# Patient Record
Sex: FEMALE | Race: WHITE | Employment: FULL TIME | URBAN - METROPOLITAN AREA
[De-identification: names, ages, dates, MRNs, and addresses within clinical notes are randomized per-mention and may not be internally consistent; named-entity substitution may affect disease eponyms.]

---

## 2017-08-08 ENCOUNTER — OFFICE VISIT (OUTPATIENT)
Dept: FAMILY MEDICINE CLINIC | Facility: CLINIC | Age: 27
End: 2017-08-08

## 2017-08-08 VITALS
WEIGHT: 114 LBS | HEIGHT: 68 IN | RESPIRATION RATE: 18 BRPM | BODY MASS INDEX: 17.28 KG/M2 | TEMPERATURE: 98 F | DIASTOLIC BLOOD PRESSURE: 68 MMHG | OXYGEN SATURATION: 98 % | SYSTOLIC BLOOD PRESSURE: 102 MMHG | HEART RATE: 65 BPM

## 2017-08-08 DIAGNOSIS — Z00.00 ENCOUNTER FOR ANNUAL PHYSICAL EXAM: Primary | ICD-10-CM

## 2017-08-08 DIAGNOSIS — Z13.29 SCREENING FOR THYROID DISORDER: ICD-10-CM

## 2017-08-08 DIAGNOSIS — Z13.6 SCREENING FOR CARDIOVASCULAR CONDITION: ICD-10-CM

## 2017-08-08 DIAGNOSIS — Z87.74 HISTORY OF PERCUTANEOUS TRANSCATHETER CLOSURE OF CONGENITAL ASD: ICD-10-CM

## 2017-08-08 DIAGNOSIS — Z13.1 SCREENING FOR DIABETES MELLITUS: ICD-10-CM

## 2017-08-08 LAB
BASOPHILS # BLD AUTO: 0.02 X10(3) UL (ref 0–0.1)
BASOPHILS NFR BLD AUTO: 0.3 %
BILIRUB UR QL STRIP.AUTO: NEGATIVE
CLARITY UR REFRACT.AUTO: CLEAR
COLOR UR AUTO: YELLOW
EOSINOPHIL # BLD AUTO: 0.07 X10(3) UL (ref 0–0.3)
EOSINOPHIL NFR BLD AUTO: 0.9 %
ERYTHROCYTE [DISTWIDTH] IN BLOOD BY AUTOMATED COUNT: 12.3 % (ref 11.5–16)
GLUCOSE UR STRIP.AUTO-MCNC: NEGATIVE MG/DL
HCT VFR BLD AUTO: 42.8 % (ref 34–50)
HGB BLD-MCNC: 14.5 G/DL (ref 12–16)
IMMATURE GRANULOCYTE COUNT: 0.02 X10(3) UL (ref 0–1)
IMMATURE GRANULOCYTE RATIO %: 0.3 %
KETONES UR STRIP.AUTO-MCNC: NEGATIVE MG/DL
LEUKOCYTE ESTERASE UR QL STRIP.AUTO: NEGATIVE
LYMPHOCYTES # BLD AUTO: 2.74 X10(3) UL (ref 0.9–4)
LYMPHOCYTES NFR BLD AUTO: 34.6 %
MCH RBC QN AUTO: 29.8 PG (ref 27–33.2)
MCHC RBC AUTO-ENTMCNC: 33.9 G/DL (ref 31–37)
MCV RBC AUTO: 87.9 FL (ref 81–100)
MONOCYTES # BLD AUTO: 0.45 X10(3) UL (ref 0.1–0.6)
MONOCYTES NFR BLD AUTO: 5.7 %
NEUTROPHIL ABS PRELIM: 4.62 X10 (3) UL (ref 1.3–6.7)
NEUTROPHILS # BLD AUTO: 4.62 X10(3) UL (ref 1.3–6.7)
NEUTROPHILS NFR BLD AUTO: 58.2 %
NITRITE UR QL STRIP.AUTO: NEGATIVE
PH UR STRIP.AUTO: 5 [PH] (ref 4.5–8)
PLATELET # BLD AUTO: 261 10(3)UL (ref 150–450)
PROT UR STRIP.AUTO-MCNC: NEGATIVE MG/DL
RBC # BLD AUTO: 4.87 X10(6)UL (ref 3.8–5.1)
RBC UR QL AUTO: NEGATIVE
RED CELL DISTRIBUTION WIDTH-SD: 39.4 FL (ref 35.1–46.3)
SP GR UR STRIP.AUTO: 1.01 (ref 1–1.03)
UROBILINOGEN UR STRIP.AUTO-MCNC: <2 MG/DL
WBC # BLD AUTO: 7.9 X10(3) UL (ref 4–13)

## 2017-08-08 PROCEDURE — 99395 PREV VISIT EST AGE 18-39: CPT | Performed by: FAMILY MEDICINE

## 2017-08-08 PROCEDURE — 81003 URINALYSIS AUTO W/O SCOPE: CPT | Performed by: FAMILY MEDICINE

## 2017-08-08 PROCEDURE — 80061 LIPID PANEL: CPT | Performed by: FAMILY MEDICINE

## 2017-08-08 PROCEDURE — 84439 ASSAY OF FREE THYROXINE: CPT | Performed by: FAMILY MEDICINE

## 2017-08-08 PROCEDURE — 36415 COLL VENOUS BLD VENIPUNCTURE: CPT | Performed by: FAMILY MEDICINE

## 2017-08-08 PROCEDURE — 80050 GENERAL HEALTH PANEL: CPT | Performed by: FAMILY MEDICINE

## 2017-08-09 LAB
ALBUMIN SERPL-MCNC: 4.5 G/DL (ref 3.5–4.8)
ALP LIVER SERPL-CCNC: 92 U/L (ref 37–98)
ALT SERPL-CCNC: 19 U/L (ref 14–54)
AST SERPL-CCNC: 17 U/L (ref 15–41)
BILIRUB SERPL-MCNC: 0.8 MG/DL (ref 0.1–2)
BUN BLD-MCNC: 10 MG/DL (ref 8–20)
CALCIUM BLD-MCNC: 9.4 MG/DL (ref 8.3–10.3)
CHLORIDE: 102 MMOL/L (ref 101–111)
CHOLEST SMN-MCNC: 150 MG/DL (ref ?–200)
CO2: 28 MMOL/L (ref 22–32)
CREAT BLD-MCNC: 0.9 MG/DL (ref 0.55–1.02)
FREE T4: 1.4 NG/DL (ref 0.9–1.8)
GLUCOSE BLD-MCNC: 81 MG/DL (ref 70–99)
HDLC SERPL-MCNC: 65 MG/DL (ref 45–?)
HDLC SERPL: 2.31 {RATIO} (ref ?–4.44)
LDLC SERPL CALC-MCNC: 78 MG/DL (ref ?–130)
LDLC SERPL-MCNC: 7 MG/DL (ref 5–40)
M PROTEIN MFR SERPL ELPH: 8 G/DL (ref 6.1–8.3)
NONHDLC SERPL-MCNC: 85 MG/DL (ref ?–130)
POTASSIUM SERPL-SCNC: 4.1 MMOL/L (ref 3.6–5.1)
SODIUM SERPL-SCNC: 138 MMOL/L (ref 136–144)
TRIGLYCERIDES: 37 MG/DL (ref ?–150)
TSI SER-ACNC: 1.64 MIU/ML (ref 0.35–5.5)

## 2017-08-14 NOTE — PROGRESS NOTES
HPI:    Patient ID: Camilo Smith is a 32year old female. HPI   26yr old female with pmhx significant for ASD repair in 2009 presents for employee physical.   C/o dislocation of her L foot that has been ongoing for past couple months.  Denies any pa Skin: Skin is warm and dry. Reddish-purple discoloration noted on distal dorsum of L foot, DP pulses 2+ bilaterally, hair growth noted on big toe bilaterally    Psychiatric: She has a normal mood and affect.  Her behavior is normal.   Nursing note and v

## 2017-08-24 ENCOUNTER — TELEPHONE (OUTPATIENT)
Dept: FAMILY MEDICINE CLINIC | Facility: CLINIC | Age: 27
End: 2017-08-24

## 2017-08-24 NOTE — TELEPHONE ENCOUNTER
Pt called asking about her cardio referral. Wants to know what should she do as her next step since the referral was approved.

## 2017-08-24 NOTE — TELEPHONE ENCOUNTER
Patient stated she will call her insurance yto confirm verification of authorization to see Dr Brandt Cole, I informed her that this was a good idea before scheduling her appointment as her insurance is from [place].

## 2017-09-22 ENCOUNTER — PRIOR ORIGINAL RECORDS (OUTPATIENT)
Dept: OTHER | Age: 27
End: 2017-09-22

## 2017-09-25 ENCOUNTER — MYAURORA ACCOUNT LINK (OUTPATIENT)
Dept: OTHER | Age: 27
End: 2017-09-25

## 2017-09-25 ENCOUNTER — HOSPITAL ENCOUNTER (OUTPATIENT)
Dept: CARDIOLOGY CLINIC | Facility: HOSPITAL | Age: 27
Discharge: HOME | End: 2017-09-25
Attending: INTERNAL MEDICINE

## 2017-09-25 DIAGNOSIS — I73.9 INTERMITTENT CLAUDICATION (HCC): ICD-10-CM

## 2017-09-25 DIAGNOSIS — L81.9 DISCOLORATION OF SKIN OF FOOT: ICD-10-CM

## 2017-10-03 ENCOUNTER — TELEPHONE (OUTPATIENT)
Dept: FAMILY MEDICINE CLINIC | Facility: CLINIC | Age: 27
End: 2017-10-03

## 2017-10-03 ENCOUNTER — PRIOR ORIGINAL RECORDS (OUTPATIENT)
Dept: OTHER | Age: 27
End: 2017-10-03

## 2017-10-03 NOTE — TELEPHONE ENCOUNTER
Pt called to update dr with results from the cardio dr. Urban Screen dr instructed her to have Ultrasound of her legs. Results were good, nothing found.  Cardio Dr instructed her to have primary send her to a Dermatologist & Rheumatologist.

## 2017-10-04 NOTE — TELEPHONE ENCOUNTER
Patient informed that an office visit is needed for referrals and she verbalized understanding, she scheduled appointment for next week

## 2017-10-10 ENCOUNTER — OFFICE VISIT (OUTPATIENT)
Dept: FAMILY MEDICINE CLINIC | Facility: CLINIC | Age: 27
End: 2017-10-10

## 2017-10-10 VITALS
RESPIRATION RATE: 18 BRPM | WEIGHT: 115 LBS | OXYGEN SATURATION: 98 % | DIASTOLIC BLOOD PRESSURE: 78 MMHG | HEART RATE: 82 BPM | HEIGHT: 68 IN | TEMPERATURE: 98 F | BODY MASS INDEX: 17.43 KG/M2 | SYSTOLIC BLOOD PRESSURE: 110 MMHG

## 2017-10-10 DIAGNOSIS — I73.00 RAYNAUD'S DISEASE WITHOUT GANGRENE: ICD-10-CM

## 2017-10-10 DIAGNOSIS — L81.9 DISCOLORATION OF SKIN OF FOOT: Primary | ICD-10-CM

## 2017-10-10 PROCEDURE — 86038 ANTINUCLEAR ANTIBODIES: CPT | Performed by: FAMILY MEDICINE

## 2017-10-10 PROCEDURE — 85652 RBC SED RATE AUTOMATED: CPT | Performed by: FAMILY MEDICINE

## 2017-10-10 PROCEDURE — 99213 OFFICE O/P EST LOW 20 MIN: CPT | Performed by: FAMILY MEDICINE

## 2017-10-10 PROCEDURE — 86140 C-REACTIVE PROTEIN: CPT | Performed by: FAMILY MEDICINE

## 2017-10-10 PROCEDURE — 36415 COLL VENOUS BLD VENIPUNCTURE: CPT | Performed by: FAMILY MEDICINE

## 2017-10-12 PROBLEM — Z86.79 HISTORY OF ATRIAL SEPTAL DEFECT: Status: ACTIVE | Noted: 2017-10-12

## 2017-10-12 PROBLEM — Z87.74 HISTORY OF ATRIAL SEPTAL DEFECT: Status: ACTIVE | Noted: 2017-10-12

## 2017-10-16 ENCOUNTER — TELEPHONE (OUTPATIENT)
Dept: FAMILY MEDICINE CLINIC | Facility: CLINIC | Age: 27
End: 2017-10-16

## 2017-11-16 PROCEDURE — 86618 LYME DISEASE ANTIBODY: CPT | Performed by: INTERNAL MEDICINE

## 2017-11-16 PROCEDURE — 82595 ASSAY OF CRYOGLOBULIN: CPT | Performed by: INTERNAL MEDICINE

## 2017-11-16 PROCEDURE — 86038 ANTINUCLEAR ANTIBODIES: CPT | Performed by: INTERNAL MEDICINE

## 2017-11-16 PROCEDURE — 86200 CCP ANTIBODY: CPT | Performed by: INTERNAL MEDICINE

## 2017-11-16 PROCEDURE — 86160 COMPLEMENT ANTIGEN: CPT | Performed by: INTERNAL MEDICINE

## 2017-11-16 PROCEDURE — 82607 VITAMIN B-12: CPT | Performed by: INTERNAL MEDICINE

## 2017-11-16 PROCEDURE — 83883 ASSAY NEPHELOMETRY NOT SPEC: CPT | Performed by: INTERNAL MEDICINE

## 2017-11-16 PROCEDURE — 84165 PROTEIN E-PHORESIS SERUM: CPT | Performed by: INTERNAL MEDICINE

## 2017-11-16 PROCEDURE — 83876 ASSAY MYELOPEROXIDASE: CPT | Performed by: INTERNAL MEDICINE

## 2017-11-16 PROCEDURE — 82746 ASSAY OF FOLIC ACID SERUM: CPT | Performed by: INTERNAL MEDICINE

## 2017-11-16 PROCEDURE — 81003 URINALYSIS AUTO W/O SCOPE: CPT | Performed by: INTERNAL MEDICINE

## 2017-11-16 PROCEDURE — 80074 ACUTE HEPATITIS PANEL: CPT | Performed by: INTERNAL MEDICINE

## 2017-11-16 PROCEDURE — 86334 IMMUNOFIX E-PHORESIS SERUM: CPT | Performed by: INTERNAL MEDICINE

## 2018-06-07 PROBLEM — B35.1 DERMATOPHYTOSIS OF NAIL: Status: ACTIVE | Noted: 2017-11-28

## 2018-06-07 PROBLEM — L81.9 DISCOLORATION OF SKIN OF FOOT: Status: ACTIVE | Noted: 2017-11-28

## 2018-06-07 PROBLEM — L95.9 VASCULITIS LIMITED TO SKIN: Status: ACTIVE | Noted: 2017-11-28

## 2018-09-11 ENCOUNTER — OFFICE VISIT (OUTPATIENT)
Dept: FAMILY MEDICINE CLINIC | Facility: CLINIC | Age: 28
End: 2018-09-11
Payer: COMMERCIAL

## 2018-09-11 VITALS
HEIGHT: 68 IN | DIASTOLIC BLOOD PRESSURE: 80 MMHG | HEART RATE: 68 BPM | SYSTOLIC BLOOD PRESSURE: 98 MMHG | BODY MASS INDEX: 17.28 KG/M2 | TEMPERATURE: 98 F | OXYGEN SATURATION: 98 % | WEIGHT: 114 LBS | RESPIRATION RATE: 18 BRPM

## 2018-09-11 DIAGNOSIS — Z00.00 LABORATORY EXAM ORDERED AS PART OF ROUTINE GENERAL MEDICAL EXAMINATION: ICD-10-CM

## 2018-09-11 DIAGNOSIS — Z13.220 SCREENING FOR LIPID DISORDERS: ICD-10-CM

## 2018-09-11 DIAGNOSIS — L81.9 DISCOLORATION OF SKIN OF FOOT: ICD-10-CM

## 2018-09-11 DIAGNOSIS — Z13.1 SCREENING FOR DIABETES MELLITUS: ICD-10-CM

## 2018-09-11 DIAGNOSIS — Z13.0 SCREENING FOR DEFICIENCY ANEMIA: ICD-10-CM

## 2018-09-11 DIAGNOSIS — Z00.00 ROUTINE GENERAL MEDICAL EXAMINATION AT A HEALTH CARE FACILITY: Primary | ICD-10-CM

## 2018-09-11 PROCEDURE — 99395 PREV VISIT EST AGE 18-39: CPT | Performed by: FAMILY MEDICINE

## 2018-09-12 NOTE — PROGRESS NOTES
Patient presents with:  Physical      HPI:  27yr old female with hx of ASD repair and Raynaud's presents for annual physical. Doing well overall. No concerns today.    Has appt with gyne in the next few weeks for her breast and pap/pelvic exam. Has Quiana HERRING Social History    Tobacco Use      Smoking status: Never Smoker      Smokeless tobacco: Never Used    Alcohol use: Yes      Comment: occ    Drug use: No        Current Outpatient Medications:  Levonorgestrel 19.5 MG Intrauterine IUD 1 Device by [employer] 05/15/2001      HEP B, Ped/Adol       12/07/2000  01/09/2001  05/15/2001      HIB                   06/09/1991  08/21/1991  01/15/1992      Hpv Virus Vaccine 9 Yuli Im                          08/25/2015  10/20/2015  02/15/2016      IPV                   1 preventative guidelines based on age  - will check fasting labs  - skin discoloration of foot stable, asymptomatic, monitor  - immunizations UTD, recommended influenza vaccine, pt defers  - informed pt to have gyne send me her upcoming pap results  - she u

## 2018-09-15 ENCOUNTER — LAB ENCOUNTER (OUTPATIENT)
Dept: LAB | Age: 28
End: 2018-09-15
Attending: FAMILY MEDICINE
Payer: COMMERCIAL

## 2018-09-15 DIAGNOSIS — Z00.00 LABORATORY EXAM ORDERED AS PART OF ROUTINE GENERAL MEDICAL EXAMINATION: ICD-10-CM

## 2018-09-15 DIAGNOSIS — Z13.1 SCREENING FOR DIABETES MELLITUS: ICD-10-CM

## 2018-09-15 DIAGNOSIS — Z13.220 SCREENING FOR LIPID DISORDERS: ICD-10-CM

## 2018-09-15 DIAGNOSIS — Z13.0 SCREENING FOR DEFICIENCY ANEMIA: ICD-10-CM

## 2018-09-15 LAB
ANION GAP SERPL CALC-SCNC: 5 MMOL/L (ref 0–18)
BASOPHILS # BLD AUTO: 0.03 X10(3) UL (ref 0–0.1)
BASOPHILS NFR BLD AUTO: 0.6 %
BUN BLD-MCNC: 11 MG/DL (ref 8–20)
BUN/CREAT SERPL: 13.8 (ref 10–20)
CALCIUM BLD-MCNC: 8.5 MG/DL (ref 8.3–10.3)
CHLORIDE SERPL-SCNC: 106 MMOL/L (ref 101–111)
CHOLEST SMN-MCNC: 123 MG/DL (ref ?–200)
CO2 SERPL-SCNC: 28 MMOL/L (ref 22–32)
CREAT BLD-MCNC: 0.8 MG/DL (ref 0.55–1.02)
EOSINOPHIL # BLD AUTO: 0.06 X10(3) UL (ref 0–0.3)
EOSINOPHIL NFR BLD AUTO: 1.2 %
ERYTHROCYTE [DISTWIDTH] IN BLOOD BY AUTOMATED COUNT: 12.4 % (ref 11.5–16)
GLUCOSE BLD-MCNC: 82 MG/DL (ref 70–99)
HCT VFR BLD AUTO: 40.7 % (ref 34–50)
HDLC SERPL-MCNC: 61 MG/DL (ref 40–59)
HGB BLD-MCNC: 13.6 G/DL (ref 12–16)
IMMATURE GRANULOCYTE COUNT: 0.01 X10(3) UL (ref 0–1)
IMMATURE GRANULOCYTE RATIO %: 0.2 %
LDLC SERPL CALC-MCNC: 58 MG/DL (ref ?–100)
LYMPHOCYTES # BLD AUTO: 1.8 X10(3) UL (ref 0.9–4)
LYMPHOCYTES NFR BLD AUTO: 35.9 %
MCH RBC QN AUTO: 30.2 PG (ref 27–33.2)
MCHC RBC AUTO-ENTMCNC: 33.4 G/DL (ref 31–37)
MCV RBC AUTO: 90.4 FL (ref 81–100)
MONOCYTES # BLD AUTO: 0.34 X10(3) UL (ref 0.1–1)
MONOCYTES NFR BLD AUTO: 6.8 %
NEUTROPHIL ABS PRELIM: 2.78 X10 (3) UL (ref 1.3–6.7)
NEUTROPHILS # BLD AUTO: 2.78 X10(3) UL (ref 1.3–6.7)
NEUTROPHILS NFR BLD AUTO: 55.3 %
NONHDLC SERPL-MCNC: 62 MG/DL (ref ?–130)
OSMOLALITY SERPL CALC.SUM OF ELEC: 286 MOSM/KG (ref 275–295)
PLATELET # BLD AUTO: 228 10(3)UL (ref 150–450)
POTASSIUM SERPL-SCNC: 4 MMOL/L (ref 3.6–5.1)
RBC # BLD AUTO: 4.5 X10(6)UL (ref 3.8–5.1)
RED CELL DISTRIBUTION WIDTH-SD: 41.1 FL (ref 35.1–46.3)
SODIUM SERPL-SCNC: 139 MMOL/L (ref 136–144)
TRIGL SERPL-MCNC: 22 MG/DL (ref 30–149)
VLDLC SERPL CALC-MCNC: 4 MG/DL (ref 0–30)
WBC # BLD AUTO: 5 X10(3) UL (ref 4–13)

## 2018-09-15 PROCEDURE — 80061 LIPID PANEL: CPT | Performed by: FAMILY MEDICINE

## 2018-09-15 PROCEDURE — 36415 COLL VENOUS BLD VENIPUNCTURE: CPT | Performed by: FAMILY MEDICINE

## 2018-09-15 PROCEDURE — 85025 COMPLETE CBC W/AUTO DIFF WBC: CPT | Performed by: FAMILY MEDICINE

## 2018-09-15 PROCEDURE — 80048 BASIC METABOLIC PNL TOTAL CA: CPT | Performed by: FAMILY MEDICINE

## 2019-02-28 VITALS
BODY MASS INDEX: 17.73 KG/M2 | WEIGHT: 117 LBS | HEART RATE: 88 BPM | SYSTOLIC BLOOD PRESSURE: 88 MMHG | HEIGHT: 68 IN | DIASTOLIC BLOOD PRESSURE: 58 MMHG

## 2019-05-08 DIAGNOSIS — Q21.10 ATRIAL SEPTAL DEFECT: Primary | ICD-10-CM

## 2019-05-15 ENCOUNTER — HOSPITAL ENCOUNTER (OUTPATIENT)
Dept: CV DIAGNOSTICS | Facility: HOSPITAL | Age: 29
Discharge: HOME | End: 2019-05-15
Attending: INTERNAL MEDICINE
Payer: COMMERCIAL

## 2019-05-15 DIAGNOSIS — Q21.1: ICD-10-CM

## 2019-05-15 PROCEDURE — 93306 TTE W/DOPPLER COMPLETE: CPT | Performed by: INTERNAL MEDICINE

## 2019-05-16 DIAGNOSIS — Q21.10 ATRIAL SEPTAL DEFECT: ICD-10-CM

## 2019-05-17 ENCOUNTER — OFFICE VISIT (OUTPATIENT)
Dept: CARDIOLOGY | Age: 29
End: 2019-05-17

## 2019-05-17 VITALS
SYSTOLIC BLOOD PRESSURE: 100 MMHG | DIASTOLIC BLOOD PRESSURE: 56 MMHG | HEIGHT: 68 IN | BODY MASS INDEX: 18.04 KG/M2 | HEART RATE: 56 BPM | WEIGHT: 119 LBS

## 2019-05-17 DIAGNOSIS — Q21.12 PFO (PATENT FORAMEN OVALE): Primary | ICD-10-CM

## 2019-05-17 DIAGNOSIS — R00.2 PALPITATIONS: ICD-10-CM

## 2019-05-17 PROCEDURE — 99214 OFFICE O/P EST MOD 30 MIN: CPT | Performed by: INTERNAL MEDICINE

## 2019-05-17 ASSESSMENT — ENCOUNTER SYMPTOMS
HEMOPTYSIS: 0
HEMATOCHEZIA: 0
SUSPICIOUS LESIONS: 0
COUGH: 0
WEIGHT GAIN: 0
WEIGHT LOSS: 0
FEVER: 0
CHILLS: 0
ALLERGIC/IMMUNOLOGIC COMMENTS: NO NEW FOOD ALLERGIES
BRUISES/BLEEDS EASILY: 0

## 2019-05-24 ENCOUNTER — MED REC SCAN ONLY (OUTPATIENT)
Dept: FAMILY MEDICINE CLINIC | Facility: CLINIC | Age: 29
End: 2019-05-24

## 2019-09-17 ENCOUNTER — OFFICE VISIT (OUTPATIENT)
Dept: FAMILY MEDICINE CLINIC | Facility: CLINIC | Age: 29
End: 2019-09-17
Payer: COMMERCIAL

## 2019-09-17 VITALS
TEMPERATURE: 98 F | BODY MASS INDEX: 17.58 KG/M2 | DIASTOLIC BLOOD PRESSURE: 72 MMHG | HEIGHT: 68 IN | RESPIRATION RATE: 18 BRPM | HEART RATE: 71 BPM | SYSTOLIC BLOOD PRESSURE: 98 MMHG | WEIGHT: 116 LBS | OXYGEN SATURATION: 98 %

## 2019-09-17 DIAGNOSIS — Z13.29 SCREENING FOR THYROID DISORDER: ICD-10-CM

## 2019-09-17 DIAGNOSIS — Z13.220 SCREENING FOR LIPID DISORDERS: ICD-10-CM

## 2019-09-17 DIAGNOSIS — Z13.0 SCREENING FOR DEFICIENCY ANEMIA: ICD-10-CM

## 2019-09-17 DIAGNOSIS — Z00.00 LABORATORY EXAM ORDERED AS PART OF ROUTINE GENERAL MEDICAL EXAMINATION: ICD-10-CM

## 2019-09-17 DIAGNOSIS — Z13.1 SCREENING FOR DIABETES MELLITUS: ICD-10-CM

## 2019-09-17 DIAGNOSIS — Z00.00 ROUTINE GENERAL MEDICAL EXAMINATION AT A HEALTH CARE FACILITY: Primary | ICD-10-CM

## 2019-09-17 DIAGNOSIS — Z28.21 INFLUENZA VACCINATION DECLINED: ICD-10-CM

## 2019-09-17 PROBLEM — Q21.1 PFO (PATENT FORAMEN OVALE): Status: ACTIVE | Noted: 2019-05-17

## 2019-09-17 PROBLEM — Q21.12 PFO (PATENT FORAMEN OVALE): Status: ACTIVE | Noted: 2019-05-17

## 2019-09-17 PROBLEM — Q21.12 PFO (PATENT FORAMEN OVALE) (HCC): Status: ACTIVE | Noted: 2019-05-17

## 2019-09-17 PROCEDURE — 99395 PREV VISIT EST AGE 18-39: CPT | Performed by: FAMILY MEDICINE

## 2019-09-17 NOTE — PATIENT INSTRUCTIONS
Prevention Guidelines, Women Ages 25 to 44  Screening tests and vaccines are an important part of managing your health. A screening test is done to find possible disorders or diseases in people who don't have any symptoms.  The goal is to find a disease e Type 2 diabetes, prediabetes All women diagnosed with gestational diabetes Lifelong testing every 3 years   Type 2 diabetes All women with prediabetes Every year   Gonorrhea Sexually active women at increased risk for infection At routine exams   Hepatitis Measles, mumps, rubella (MMR) All women in this age group who have no record of these infections or vaccines 1 or 2 doses   Meningococcal Women at increased risk for infection should talk with their healthcare provider 1 or more doses   Pneumococcal conjug © 9372-4759 [employer] [address] [facility] [place]. All rights reserved. This information is not intended as a substitute for professional medical care. Always follow your healthcare professional's instructions.

## 2019-09-17 NOTE — PROGRESS NOTES
Patient presents with:  Physical      HPI:  28yr old female presents for annual physical. Doing well overall. Seen gyne for her well woman exams. Last pap done in 2018. Has Quiana and due for removal in 2/2020. Has spotting occasionally.    Hx of ASD repai Smokeless tobacco: Never Used    Alcohol use: Yes      Comment: occ    Drug use: No        Current Outpatient Medications:  Levonorgestrel 19.5 MG Intrauterine IUD 1 Device by Intrauterine route one time.  Disp:  Rfl:          Sulfa Antibiotics       RASH Virus Vaccine 9 Yuli Im                          08/25/2015  10/20/2015  02/15/2016      IPV                   12/07/1990 04/05/1991 06/07/1991 04/06/1995      MMR                   01/15/1992  01/15/1992  04/26/1995 recommended influenza vaccine, pt defers  - she understands and agrees with tx plan  - RTC in 1yr for next physical, sooner if needed    Orders Placed This Encounter      CBC With Diff      CMP      Lipid      TSH W Reflex To Free T4      Meds & Refills fo

## 2019-09-21 ENCOUNTER — LAB ENCOUNTER (OUTPATIENT)
Dept: LAB | Age: 29
End: 2019-09-21
Attending: FAMILY MEDICINE
Payer: COMMERCIAL

## 2019-09-21 DIAGNOSIS — Z00.00 LABORATORY EXAM ORDERED AS PART OF ROUTINE GENERAL MEDICAL EXAMINATION: ICD-10-CM

## 2019-09-21 DIAGNOSIS — Z13.1 SCREENING FOR DIABETES MELLITUS: ICD-10-CM

## 2019-09-21 DIAGNOSIS — Z13.29 SCREENING FOR THYROID DISORDER: ICD-10-CM

## 2019-09-21 DIAGNOSIS — Z13.0 SCREENING FOR DEFICIENCY ANEMIA: ICD-10-CM

## 2019-09-21 DIAGNOSIS — Z13.220 SCREENING FOR LIPID DISORDERS: ICD-10-CM

## 2019-09-21 LAB
ALBUMIN SERPL-MCNC: 4 G/DL (ref 3.4–5)
ALBUMIN/GLOB SERPL: 1.2 {RATIO} (ref 1–2)
ALP LIVER SERPL-CCNC: 79 U/L (ref 37–98)
ALT SERPL-CCNC: 16 U/L (ref 13–56)
ANION GAP SERPL CALC-SCNC: 6 MMOL/L (ref 0–18)
AST SERPL-CCNC: 16 U/L (ref 15–37)
BASOPHILS # BLD AUTO: 0.03 X10(3) UL (ref 0–0.2)
BASOPHILS NFR BLD AUTO: 0.5 %
BILIRUB SERPL-MCNC: 0.8 MG/DL (ref 0.1–2)
BUN BLD-MCNC: 11 MG/DL (ref 7–18)
BUN/CREAT SERPL: 12 (ref 10–20)
CALCIUM BLD-MCNC: 8.9 MG/DL (ref 8.5–10.1)
CHLORIDE SERPL-SCNC: 105 MMOL/L (ref 98–112)
CHOLEST SMN-MCNC: 138 MG/DL (ref ?–200)
CO2 SERPL-SCNC: 27 MMOL/L (ref 21–32)
CREAT BLD-MCNC: 0.92 MG/DL (ref 0.55–1.02)
DEPRECATED RDW RBC AUTO: 39.1 FL (ref 35.1–46.3)
EOSINOPHIL # BLD AUTO: 0.05 X10(3) UL (ref 0–0.7)
EOSINOPHIL NFR BLD AUTO: 0.9 %
ERYTHROCYTE [DISTWIDTH] IN BLOOD BY AUTOMATED COUNT: 12 % (ref 11–15)
GLOBULIN PLAS-MCNC: 3.4 G/DL (ref 2.8–4.4)
GLUCOSE BLD-MCNC: 75 MG/DL (ref 70–99)
HCT VFR BLD AUTO: 42.7 % (ref 35–48)
HDLC SERPL-MCNC: 54 MG/DL (ref 40–59)
HGB BLD-MCNC: 14.5 G/DL (ref 12–16)
IMM GRANULOCYTES # BLD AUTO: 0.01 X10(3) UL (ref 0–1)
IMM GRANULOCYTES NFR BLD: 0.2 %
LDLC SERPL CALC-MCNC: 76 MG/DL (ref ?–100)
LYMPHOCYTES # BLD AUTO: 1.77 X10(3) UL (ref 1–4)
LYMPHOCYTES NFR BLD AUTO: 30.8 %
M PROTEIN MFR SERPL ELPH: 7.4 G/DL (ref 6.4–8.2)
MCH RBC QN AUTO: 30.1 PG (ref 26–34)
MCHC RBC AUTO-ENTMCNC: 34 G/DL (ref 31–37)
MCV RBC AUTO: 88.6 FL (ref 80–100)
MONOCYTES # BLD AUTO: 0.32 X10(3) UL (ref 0.1–1)
MONOCYTES NFR BLD AUTO: 5.6 %
NEUTROPHILS # BLD AUTO: 3.56 X10 (3) UL (ref 1.5–7.7)
NEUTROPHILS # BLD AUTO: 3.56 X10(3) UL (ref 1.5–7.7)
NEUTROPHILS NFR BLD AUTO: 62 %
NONHDLC SERPL-MCNC: 84 MG/DL (ref ?–130)
OSMOLALITY SERPL CALC.SUM OF ELEC: 284 MOSM/KG (ref 275–295)
PLATELET # BLD AUTO: 249 10(3)UL (ref 150–450)
POTASSIUM SERPL-SCNC: 4.1 MMOL/L (ref 3.5–5.1)
RBC # BLD AUTO: 4.82 X10(6)UL (ref 3.8–5.3)
SODIUM SERPL-SCNC: 138 MMOL/L (ref 136–145)
TRIGL SERPL-MCNC: 42 MG/DL (ref 30–149)
TSI SER-ACNC: 1.39 MIU/ML (ref 0.36–3.74)
VLDLC SERPL CALC-MCNC: 8 MG/DL (ref 0–30)
WBC # BLD AUTO: 5.7 X10(3) UL (ref 4–11)

## 2019-09-21 PROCEDURE — 80061 LIPID PANEL: CPT

## 2019-09-21 PROCEDURE — 36415 COLL VENOUS BLD VENIPUNCTURE: CPT

## 2019-09-21 PROCEDURE — 84443 ASSAY THYROID STIM HORMONE: CPT

## 2019-09-21 PROCEDURE — 85025 COMPLETE CBC W/AUTO DIFF WBC: CPT

## 2019-09-21 PROCEDURE — 80053 COMPREHEN METABOLIC PANEL: CPT

## 2019-10-27 ENCOUNTER — HOSPITAL ENCOUNTER (OUTPATIENT)
Age: 29
Discharge: HOME | End: 2019-10-27
Attending: FAMILY MEDICINE
Payer: COMMERCIAL

## 2019-10-27 VITALS
HEIGHT: 68 IN | HEART RATE: 79 BPM | TEMPERATURE: 99 F | BODY MASS INDEX: 18.19 KG/M2 | SYSTOLIC BLOOD PRESSURE: 84 MMHG | DIASTOLIC BLOOD PRESSURE: 49 MMHG | OXYGEN SATURATION: 98 % | WEIGHT: 120 LBS | RESPIRATION RATE: 18 BRPM

## 2019-10-27 DIAGNOSIS — N30.00 ACUTE CYSTITIS WITHOUT HEMATURIA: Primary | ICD-10-CM

## 2019-10-27 PROCEDURE — 87086 URINE CULTURE/COLONY COUNT: CPT | Performed by: FAMILY MEDICINE

## 2019-10-27 PROCEDURE — 81002 URINALYSIS NONAUTO W/O SCOPE: CPT | Performed by: FAMILY MEDICINE

## 2019-10-27 PROCEDURE — 81025 URINE PREGNANCY TEST: CPT | Performed by: FAMILY MEDICINE

## 2019-10-27 PROCEDURE — 87077 CULTURE AEROBIC IDENTIFY: CPT | Performed by: FAMILY MEDICINE

## 2019-10-27 PROCEDURE — 99204 OFFICE O/P NEW MOD 45 MIN: CPT

## 2019-10-27 PROCEDURE — 99214 OFFICE O/P EST MOD 30 MIN: CPT

## 2019-10-27 PROCEDURE — 87186 SC STD MICRODIL/AGAR DIL: CPT | Performed by: FAMILY MEDICINE

## 2019-10-27 RX ORDER — PHENAZOPYRIDINE HYDROCHLORIDE 200 MG/1
200 TABLET, FILM COATED ORAL 3 TIMES DAILY PRN
Qty: 6 TABLET | Refills: 0 | Status: SHIPPED | OUTPATIENT
Start: 2019-10-27 | End: 2019-10-29

## 2019-10-27 RX ORDER — NITROFURANTOIN 25; 75 MG/1; MG/1
100 CAPSULE ORAL 2 TIMES DAILY
Qty: 14 CAPSULE | Refills: 0 | Status: SHIPPED | OUTPATIENT
Start: 2019-10-27 | End: 2019-11-03

## 2019-10-27 NOTE — ED PROVIDER NOTES
Patient presents with:  Urinary Symptoms (urologic)    HPI:     Anna Hodges is a 34year old female who presents with suspected symptoms of UTI  Onset of symptoms: 3  days  Complains of:  - Dysuria: yes   - Frequency and  urgency of urination: yes midline  Back: symmetric, no curvature. ROM normal. No CVA tenderness. . +suprapubic tenderness  Lungs: clear to auscultation bilaterally  Heart: S1, S2 normal, no murmur, click, rub or gallop, regular rate and rhythm  Abdomen: soft, non-tender; bowel sound POCT URINALYSIS DIPSTICK     POCT PREGNANCY, URINE     POCT PREGNANCY, URINE     URINE CULTURE, ROUTINE     URINE CULTURE, ROUTINE     Nitrofurantoin Monohyd Macro 100 MG Oral Cap     Phenazopyridine HCl 200 MG Oral Tab         Plan:  Rx: Azul Hugo

## 2020-09-29 ENCOUNTER — OFFICE VISIT (OUTPATIENT)
Dept: FAMILY MEDICINE CLINIC | Facility: CLINIC | Age: 30
End: 2020-09-29
Payer: COMMERCIAL

## 2020-09-29 VITALS
DIASTOLIC BLOOD PRESSURE: 60 MMHG | SYSTOLIC BLOOD PRESSURE: 102 MMHG | HEIGHT: 67.8 IN | BODY MASS INDEX: 17.84 KG/M2 | TEMPERATURE: 98 F | OXYGEN SATURATION: 98 % | WEIGHT: 116.38 LBS | RESPIRATION RATE: 16 BRPM | HEART RATE: 71 BPM

## 2020-09-29 DIAGNOSIS — Z13.0 SCREENING FOR DEFICIENCY ANEMIA: ICD-10-CM

## 2020-09-29 DIAGNOSIS — Z13.220 SCREENING FOR LIPID DISORDERS: ICD-10-CM

## 2020-09-29 DIAGNOSIS — Z00.00 WELL WOMAN EXAM (NO GYNECOLOGICAL EXAM): Primary | ICD-10-CM

## 2020-09-29 DIAGNOSIS — D22.9 MULTIPLE NEVI: ICD-10-CM

## 2020-09-29 DIAGNOSIS — D22.9 CHANGE IN MOLE: ICD-10-CM

## 2020-09-29 DIAGNOSIS — Z23 NEED FOR VACCINATION: ICD-10-CM

## 2020-09-29 PROCEDURE — 3074F SYST BP LT 130 MM HG: CPT | Performed by: FAMILY MEDICINE

## 2020-09-29 PROCEDURE — 3078F DIAST BP <80 MM HG: CPT | Performed by: FAMILY MEDICINE

## 2020-09-29 PROCEDURE — 90686 IIV4 VACC NO PRSV 0.5 ML IM: CPT | Performed by: FAMILY MEDICINE

## 2020-09-29 PROCEDURE — 90471 IMMUNIZATION ADMIN: CPT | Performed by: FAMILY MEDICINE

## 2020-09-29 PROCEDURE — 99395 PREV VISIT EST AGE 18-39: CPT | Performed by: FAMILY MEDICINE

## 2020-09-29 PROCEDURE — 3008F BODY MASS INDEX DOCD: CPT | Performed by: FAMILY MEDICINE

## 2020-09-29 NOTE — PATIENT INSTRUCTIONS
Prevention Guidelines, Women Ages 25 to 44  Screening tests and vaccines are an important part of managing your health. A screening test is done to find possible disorders or diseases in people who don't have any symptoms.  The goal is to find a disease e Hepatitis C Anyone at increased risk  At routine exams   HIV All women At routine exams3     Obesity All women in this age group  At routine exams   Syphilis Women at increased risk for infection should talk with their healthcare provider  At routine exams Pneumococcal conjugate vaccine (PCV13) and pneumococcal polysaccharide vaccine (PPSV23)  Women at increased risk for infection should talk with their healthcare provider  PCV13: 1 dose ages 23 to 72 (protects against 13 types of pneumococcal bacteria)   PP 3 The USPSTF recommends that all people ages 13 to 72 years be screened for HIV and those younger or older people at increased risk.  The CDC recommends that everyone between the ages of 15 and 59 get tested for HIV at least once as part of routine health c · Have had more lifetime exposure to the sun  · Have had severe blistering sunburns  · Use tanning beds  · Have a personal or family history of skin cancer  To manage your risk, it’s smart to check your moles for changes and ask your healthcare provider to

## 2020-09-30 NOTE — PROGRESS NOTES
Patient presents with:  Physical      HPI:  29yr old female presents for her annual physical. Follows with gyne for her WWE. Last pap done in 2018. Has Mirena IUD in place. Does not get her periods.   Hx of ASD repair and PFO, following with cards, Dr. Mateusz Rodriguez status: Never Smoker      Smokeless tobacco: Never Used    Alcohol use: Yes      Comment: occ    Drug use: No      Current Outpatient Medications   Medication Sig Dispense Refill   • Levonorgestrel 19.5 MG Intrauterine IUD 1 Device by Intrauterine route on 04/26/1995      FLULAVAL 6 months & older 0.5 ml Prefilled syringe (99944)                          09/29/2020      HEP A                 03/24/2011      HEP A,Ped/Adol,(2 Dose)                          10/20/2011      HEP B                 12/07/2000 01/ 40)    Colon Cancer Screening: Starting at the age of 48. Yearly FOBT, sigmoidoscopy every 5 years, or colonoscopy every 10 years.        A/P:    Well woman exam (no gynecological exam)  (primary encounter diagnosis)  Screening for lipid disorders  Screeni

## 2020-10-05 ENCOUNTER — LAB ENCOUNTER (OUTPATIENT)
Dept: LAB | Age: 30
End: 2020-10-05
Attending: FAMILY MEDICINE
Payer: COMMERCIAL

## 2020-10-05 DIAGNOSIS — Z13.0 SCREENING FOR DEFICIENCY ANEMIA: ICD-10-CM

## 2020-10-05 DIAGNOSIS — Z13.220 SCREENING FOR LIPID DISORDERS: ICD-10-CM

## 2020-10-05 PROCEDURE — 80061 LIPID PANEL: CPT

## 2020-10-05 PROCEDURE — 85025 COMPLETE CBC W/AUTO DIFF WBC: CPT

## 2020-10-05 PROCEDURE — 36415 COLL VENOUS BLD VENIPUNCTURE: CPT

## 2021-02-22 ENCOUNTER — PATIENT MESSAGE (OUTPATIENT)
Dept: FAMILY MEDICINE CLINIC | Facility: CLINIC | Age: 31
End: 2021-02-22

## 2021-02-24 ENCOUNTER — PATIENT MESSAGE (OUTPATIENT)
Dept: FAMILY MEDICINE CLINIC | Facility: CLINIC | Age: 31
End: 2021-02-24

## 2021-02-24 NOTE — TELEPHONE ENCOUNTER
From: Roxy Elise  To: Lennox Ackerman DO  Sent: 2/24/2021 7:26 AM CST  Subject: Non-Urgent Medical Question    Brody Joshi,     I had read the information that [facility]  was not able to provide the first doses from the Home Screen of the portal, but keisha

## 2021-08-09 ENCOUNTER — HOSPITAL ENCOUNTER (OUTPATIENT)
Dept: CV DIAGNOSTICS | Facility: HOSPITAL | Age: 31
Discharge: HOME | End: 2021-08-09
Attending: INTERNAL MEDICINE
Payer: COMMERCIAL

## 2021-08-09 DIAGNOSIS — Q21.1 PATENT FORAMEN OVALE: ICD-10-CM

## 2021-08-09 DIAGNOSIS — R00.2 PALPITATIONS: ICD-10-CM

## 2021-08-09 PROCEDURE — 93306 TTE W/DOPPLER COMPLETE: CPT | Performed by: INTERNAL MEDICINE

## 2021-09-07 ENCOUNTER — OFFICE VISIT (OUTPATIENT)
Dept: FAMILY MEDICINE CLINIC | Facility: CLINIC | Age: 31
End: 2021-09-07
Payer: COMMERCIAL

## 2021-09-07 VITALS
WEIGHT: 115.38 LBS | DIASTOLIC BLOOD PRESSURE: 56 MMHG | OXYGEN SATURATION: 98 % | RESPIRATION RATE: 16 BRPM | HEIGHT: 68.62 IN | SYSTOLIC BLOOD PRESSURE: 92 MMHG | HEART RATE: 64 BPM | BODY MASS INDEX: 17.29 KG/M2 | TEMPERATURE: 98 F

## 2021-09-07 DIAGNOSIS — Z13.0 SCREENING FOR DEFICIENCY ANEMIA: ICD-10-CM

## 2021-09-07 DIAGNOSIS — Z13.220 SCREENING FOR LIPID DISORDERS: ICD-10-CM

## 2021-09-07 DIAGNOSIS — Z00.00 WELL WOMAN EXAM (NO GYNECOLOGICAL EXAM): Primary | ICD-10-CM

## 2021-09-07 DIAGNOSIS — N64.4 PAIN OF LEFT BREAST: ICD-10-CM

## 2021-09-07 DIAGNOSIS — Z00.00 LABORATORY EXAM ORDERED AS PART OF ROUTINE GENERAL MEDICAL EXAMINATION: ICD-10-CM

## 2021-09-07 PROCEDURE — 3078F DIAST BP <80 MM HG: CPT | Performed by: FAMILY MEDICINE

## 2021-09-07 PROCEDURE — 3074F SYST BP LT 130 MM HG: CPT | Performed by: FAMILY MEDICINE

## 2021-09-07 PROCEDURE — 99395 PREV VISIT EST AGE 18-39: CPT | Performed by: FAMILY MEDICINE

## 2021-09-07 PROCEDURE — 3008F BODY MASS INDEX DOCD: CPT | Performed by: FAMILY MEDICINE

## 2021-09-07 NOTE — PROGRESS NOTES
Patient presents with:  Physical      HPI:  35yr old female presents for her annual physical and c/o breast pain. Does not get regular menses due to [place] IUD. Follows with gyne. C/o having 3 episodes of L sided breast pain over the past few months.  Vicki Hastings History    Tobacco Use      Smoking status: Never Smoker      Smokeless tobacco: Never Used    Alcohol use: Yes      Comment: occ    Drug use: No      Current Outpatient Medications   Medication Sig Dispense Refill   • Levonorgestrel 19.5 MG Intrauterine I 04/26/1995      DTAP INFANRIX         12/07/1990 04/05/1991 06/07/1991 06/05/1992 04/26/1995      DTP                   12/07/1990 04/05/1991 06/07/1991 06/05/1992 04/26/1995      FLULAVAL 6 month starting at the age of 36.   If positive family hx (first degree relative) - Annual mammography starting ten years prior to the age of the youngest family member with breast cancer (but not earlier than age 22 and not later than age 36)    Colon Cancer Scre

## 2021-09-07 NOTE — PATIENT INSTRUCTIONS
Prevention Guidelines, Women Ages 25 to 44  Screening tests and vaccines are an important part of managing your health. A screening test is done to find possible disorders or diseases in people who don't have any symptoms.  The goal is to find a disease e Anyone at increased risk  At routine exams   HIV All women At routine exams3     Obesity All women in this age group  At routine exams   Syphilis Women at increased risk for infection should talk with their healthcare provider  At routine exams   Tuberculo (protects against 13 types of pneumococcal bacteria)   PPSV23: 1 to 2 doses through age 59, or 1 dose at 72 or older (protects against 23 types of pneumococcal bacteria)    Tetanus/diphtheria/pertussis (Td/Tdap) booster All women in this age group  Td ever not intended as a substitute for professional medical care. Always follow your healthcare professional's instructions. Breast Anatomy  Breasts come in all shapes and sizes. But they all share the same features.  You can learn about the parts, or (stevie

## 2021-09-18 ENCOUNTER — LAB ENCOUNTER (OUTPATIENT)
Dept: LAB | Age: 31
End: 2021-09-18
Attending: FAMILY MEDICINE
Payer: COMMERCIAL

## 2021-09-18 DIAGNOSIS — Z00.00 LABORATORY EXAM ORDERED AS PART OF ROUTINE GENERAL MEDICAL EXAMINATION: ICD-10-CM

## 2021-09-18 DIAGNOSIS — Z13.0 SCREENING FOR DEFICIENCY ANEMIA: ICD-10-CM

## 2021-09-18 DIAGNOSIS — Z13.220 SCREENING FOR LIPID DISORDERS: ICD-10-CM

## 2021-09-18 LAB
BASOPHILS # BLD AUTO: 0.03 X10(3) UL (ref 0–0.2)
BASOPHILS NFR BLD AUTO: 0.6 %
CHOLEST SERPL-MCNC: 136 MG/DL (ref ?–200)
EOSINOPHIL # BLD AUTO: 0.08 X10(3) UL (ref 0–0.7)
EOSINOPHIL NFR BLD AUTO: 1.6 %
ERYTHROCYTE [DISTWIDTH] IN BLOOD BY AUTOMATED COUNT: 12.2 %
HCT VFR BLD AUTO: 40.2 %
HDLC SERPL-MCNC: 53 MG/DL (ref 40–59)
HGB BLD-MCNC: 13.2 G/DL
IMM GRANULOCYTES # BLD AUTO: 0.01 X10(3) UL (ref 0–1)
IMM GRANULOCYTES NFR BLD: 0.2 %
LDLC SERPL CALC-MCNC: 73 MG/DL (ref ?–100)
LYMPHOCYTES # BLD AUTO: 1.93 X10(3) UL (ref 1–4)
LYMPHOCYTES NFR BLD AUTO: 38.8 %
MCH RBC QN AUTO: 28.9 PG (ref 26–34)
MCHC RBC AUTO-ENTMCNC: 32.8 G/DL (ref 31–37)
MCV RBC AUTO: 88.2 FL
MONOCYTES # BLD AUTO: 0.33 X10(3) UL (ref 0.1–1)
MONOCYTES NFR BLD AUTO: 6.6 %
NEUTROPHILS # BLD AUTO: 2.6 X10 (3) UL (ref 1.5–7.7)
NEUTROPHILS # BLD AUTO: 2.6 X10(3) UL (ref 1.5–7.7)
NEUTROPHILS NFR BLD AUTO: 52.2 %
NONHDLC SERPL-MCNC: 83 MG/DL (ref ?–130)
PATIENT FASTING Y/N/NP: YES
PLATELET # BLD AUTO: 238 10(3)UL (ref 150–450)
RBC # BLD AUTO: 4.56 X10(6)UL
TRIGL SERPL-MCNC: 44 MG/DL (ref 30–149)
VLDLC SERPL CALC-MCNC: 7 MG/DL (ref 0–30)
WBC # BLD AUTO: 5 X10(3) UL (ref 4–11)

## 2021-09-18 PROCEDURE — 85025 COMPLETE CBC W/AUTO DIFF WBC: CPT | Performed by: FAMILY MEDICINE

## 2021-09-18 PROCEDURE — 80061 LIPID PANEL: CPT | Performed by: FAMILY MEDICINE

## 2022-06-22 ENCOUNTER — TELEPHONE (OUTPATIENT)
Dept: FAMILY MEDICINE CLINIC | Facility: CLINIC | Age: 32
End: 2022-06-22

## 2022-06-22 NOTE — TELEPHONE ENCOUNTER
Gumaro Poole from [address] calling stating pt has an appt Friday at 10:30am for a mammogram and US of her breasts. I see order for mammo, however from September is that one still valid? And US needed because pt is having breast pain in both.  Please advise      Asking for orders to be faxed to [phone]

## 2022-06-22 NOTE — TELEPHONE ENCOUNTER
The current order is for a bilateral diagnostic mammogram with US.  Therefore, it should be okay for her upcoming appt and c/o bilateral breast pain

## 2022-06-22 NOTE — TELEPHONE ENCOUNTER
Noted ordered diag mammo expires in 1 year on 9/7/2022, should include US as well. However, dx includes only left breast pain. Dr. Gareth Farias- pt f/u OV to address bilateral breat pain and update mammogram order? Please advise. Thank you.

## 2022-07-22 ENCOUNTER — MED REC SCAN ONLY (OUTPATIENT)
Dept: FAMILY MEDICINE CLINIC | Facility: CLINIC | Age: 32
End: 2022-07-22

## 2022-08-08 ENCOUNTER — OFFICE VISIT (OUTPATIENT)
Dept: FAMILY MEDICINE CLINIC | Facility: CLINIC | Age: 32
End: 2022-08-08
Payer: COMMERCIAL

## 2022-08-08 VITALS
DIASTOLIC BLOOD PRESSURE: 56 MMHG | RESPIRATION RATE: 16 BRPM | WEIGHT: 118.25 LBS | SYSTOLIC BLOOD PRESSURE: 94 MMHG | HEIGHT: 68 IN | OXYGEN SATURATION: 99 % | TEMPERATURE: 97 F | BODY MASS INDEX: 17.92 KG/M2 | HEART RATE: 71 BPM

## 2022-08-08 DIAGNOSIS — Z13.0 SCREENING FOR DEFICIENCY ANEMIA: ICD-10-CM

## 2022-08-08 DIAGNOSIS — Q21.1 ASD (ATRIAL SEPTAL DEFECT): ICD-10-CM

## 2022-08-08 DIAGNOSIS — Z86.79 HISTORY OF ATRIAL SEPTAL DEFECT: ICD-10-CM

## 2022-08-08 DIAGNOSIS — Z00.00 ROUTINE GENERAL MEDICAL EXAMINATION AT A HEALTH CARE FACILITY: Primary | ICD-10-CM

## 2022-08-08 DIAGNOSIS — Z13.220 SCREENING FOR LIPID DISORDERS: ICD-10-CM

## 2022-08-08 PROCEDURE — 3008F BODY MASS INDEX DOCD: CPT | Performed by: FAMILY MEDICINE

## 2022-08-08 PROCEDURE — 3074F SYST BP LT 130 MM HG: CPT | Performed by: FAMILY MEDICINE

## 2022-08-08 PROCEDURE — 99395 PREV VISIT EST AGE 18-39: CPT | Performed by: FAMILY MEDICINE

## 2022-08-08 PROCEDURE — 3078F DIAST BP <80 MM HG: CPT | Performed by: FAMILY MEDICINE

## 2022-09-29 ENCOUNTER — LAB ENCOUNTER (OUTPATIENT)
Dept: LAB | Facility: HOSPITAL | Age: 32
End: 2022-09-29
Attending: FAMILY MEDICINE
Payer: COMMERCIAL

## 2022-09-29 DIAGNOSIS — Z13.220 SCREENING FOR LIPID DISORDERS: ICD-10-CM

## 2022-09-29 DIAGNOSIS — Z13.0 SCREENING FOR DEFICIENCY ANEMIA: ICD-10-CM

## 2022-09-29 LAB
BASOPHILS # BLD AUTO: 0.04 X10(3) UL (ref 0–0.2)
BASOPHILS NFR BLD AUTO: 0.7 %
CHOLEST SERPL-MCNC: 132 MG/DL (ref ?–200)
EOSINOPHIL # BLD AUTO: 0.08 X10(3) UL (ref 0–0.7)
EOSINOPHIL NFR BLD AUTO: 1.4 %
ERYTHROCYTE [DISTWIDTH] IN BLOOD BY AUTOMATED COUNT: 11.9 %
FASTING PATIENT LIPID ANSWER: YES
HCT VFR BLD AUTO: 40.1 %
HDLC SERPL-MCNC: 52 MG/DL (ref 40–59)
HGB BLD-MCNC: 13.4 G/DL
IMM GRANULOCYTES # BLD AUTO: 0.01 X10(3) UL (ref 0–1)
IMM GRANULOCYTES NFR BLD: 0.2 %
LDLC SERPL CALC-MCNC: 69 MG/DL (ref ?–100)
LYMPHOCYTES # BLD AUTO: 2.39 X10(3) UL (ref 1–4)
LYMPHOCYTES NFR BLD AUTO: 42.5 %
MCH RBC QN AUTO: 30.2 PG (ref 26–34)
MCHC RBC AUTO-ENTMCNC: 33.4 G/DL (ref 31–37)
MCV RBC AUTO: 90.5 FL
MONOCYTES # BLD AUTO: 0.31 X10(3) UL (ref 0.1–1)
MONOCYTES NFR BLD AUTO: 5.5 %
NEUTROPHILS # BLD AUTO: 2.79 X10 (3) UL (ref 1.5–7.7)
NEUTROPHILS # BLD AUTO: 2.79 X10(3) UL (ref 1.5–7.7)
NEUTROPHILS NFR BLD AUTO: 49.7 %
NONHDLC SERPL-MCNC: 80 MG/DL (ref ?–130)
PLATELET # BLD AUTO: 245 10(3)UL (ref 150–450)
RBC # BLD AUTO: 4.43 X10(6)UL
TRIGL SERPL-MCNC: 51 MG/DL (ref 30–149)
VLDLC SERPL CALC-MCNC: 8 MG/DL (ref 0–30)
WBC # BLD AUTO: 5.6 X10(3) UL (ref 4–11)

## 2022-09-29 PROCEDURE — 36415 COLL VENOUS BLD VENIPUNCTURE: CPT

## 2022-09-29 PROCEDURE — 80061 LIPID PANEL: CPT

## 2022-09-29 PROCEDURE — 85025 COMPLETE CBC W/AUTO DIFF WBC: CPT

## 2024-10-31 ENCOUNTER — OFFICE VISIT (OUTPATIENT)
Dept: FAMILY MEDICINE CLINIC | Facility: CLINIC | Age: 34
End: 2024-10-31
Payer: COMMERCIAL

## 2024-10-31 VITALS
HEIGHT: 68 IN | TEMPERATURE: 97 F | RESPIRATION RATE: 16 BRPM | HEART RATE: 68 BPM | BODY MASS INDEX: 17.58 KG/M2 | DIASTOLIC BLOOD PRESSURE: 60 MMHG | WEIGHT: 116 LBS | SYSTOLIC BLOOD PRESSURE: 104 MMHG | OXYGEN SATURATION: 97 %

## 2024-10-31 DIAGNOSIS — Z00.00 ROUTINE GENERAL MEDICAL EXAMINATION AT A HEALTH CARE FACILITY: Primary | ICD-10-CM

## 2024-10-31 DIAGNOSIS — Q21.12 PFO (PATENT FORAMEN OVALE) (HCC): ICD-10-CM

## 2024-10-31 DIAGNOSIS — Z00.00 LABORATORY EXAM ORDERED AS PART OF ROUTINE GENERAL MEDICAL EXAMINATION: ICD-10-CM

## 2024-10-31 NOTE — PROGRESS NOTES
Chief Complaint   Patient presents with    Physical       HPI:  34yr old female presents for her annual physical. Follows with gyne for her WWE. Pap done 10/2024.  Has Kyleena IUD in place since 2020. LMP 10/3.  No other concerns today.     Review of Systems   Constitutional: Negative for fever, chills and fatigue   HENT: Negative for hearing loss, congestion, sore throat    Eyes: Negative for pain and visual disturbance.   Respiratory: Negative for cough, chest tightness, shortness of breath and wheezing.    Cardiovascular: Negative for chest pain, palpitations and leg swelling.   Gastrointestinal: Negative for nausea, vomiting, abdominal pain, diarrhea, blood in stool Genitourinary: Negative for dysuria, hematuria and difficulty urinating.   Musculoskeletal: Negative for myalgias, back pain, joint swelling   Skin: Negative for color change and rash.   Neurological: Negative for dizziness, syncope, weakness, and headaches.   Hematological: Negative for adenopathy. Does not bruise/bleed easily.   Psychiatric/Behavioral: The patient is not nervous/anxious. No depression.    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    PFO (patent foramen ovale) ([facility])       Past Medical History:    ASD (atrial septal defect) ([facility])    repair 2009    Fainting     Past Surgical History:   Procedure Laterality Date    Cardiac valve surgery  2009    ASD closure    Other surgical history      ASD Repair      Family History   Problem Relation Age of Onset    Hypertension Father     Crohn's Disease Brother     Cancer Maternal Grandmother     Cancer Maternal Grandfather     Hypertension Paternal Grandmother      Social History     Socioeconomic History    Marital status: Single   Tobacco Use    Smoking status: Never     Passive exposure: Never    Smokeless tobacco: Never   Vaping Use     Vaping status: Never Used   Substance and Sexual Activity    Alcohol use: Yes     Comment: occ    Drug use: No   Other Topics Concern    Caffeine Concern No    Exercise No    Seat Belt Yes    Special Diet No    Stress Concern No    Weight Concern No     Social Drivers of Health      Received from [facility]    Social Connections    Received from [facility]    Housing Stability       Current Outpatient Medications   Medication Sig Dispense Refill    Levonorgestrel 19.5 MG Intrauterine IUD 19,500 mcg by Intrauterine route one time.         Allergies[1]      Physical Exam  /60   Pulse 68   Temp 97 °F (36.1 °C) (Temporal)   Resp 16   Ht 5' 8\" (1.727 m)   Wt 116 lb (52.6 kg)   SpO2 97%   BMI 17.64 kg/m²   Constitutional: Oriented to person, place, and time. No distress.   HEENT:  Normocephalic and atraumatic. Hearing and tympanic membranes normal. Oropharynx is clear and moist.   Eyes: EOM are normal. PERRLA.    Neck: Supple  Cardiovascular: Normal rate, regular rhythm and intact distal pulses.     Pulmonary/Chest: Effort normal and breath sounds normal. No respiratory distress. No wheezes, rhonchi or rales  Abdominal: Soft. Bowel sounds are normal. Non tender, no masses, no organomegaly   Musculoskeletal: Normal range of motion. No edema and no tenderness.   Lymphadenopathy: No cervical adenopathy.   Neurological: Normal reflexes. No cranial nerve deficit or sensory deficit. Normal muscle tone. Coordination normal.   Skin: Skin is warm and dry. No rash noted. No erythema   Psychiatric: Normal mood and affect.     Health Maintenance:    1. Colon cancer screening: n/a  2. Last mammogram: n/a  3. Last Pap smear: 2024  4. Dexa Scan: n/a  5. Immunizations:   Immunization History   Administered Date(s) Administered    Covid-19 Vaccine Pfizer 30 mcg/0.3 ml 03/23/2021, 04/13/2021, 10/16/2021    Covid-19 Vaccine Pfizer Bivalent 30mcg/0.3mL 09/23/2022    DTAP 12/07/1990,  12/07/1990, 04/05/1991, 04/05/1991, 06/07/1991, 06/07/1991, 06/05/1992, 06/05/1992, 04/26/1995, 04/26/1995    DTP 12/07/1990, 04/05/1991, 06/07/1991, 06/05/1992, 04/26/1995    FLULAVAL 6 months & older 0.5 ml Prefilled syringe (88584) 09/29/2020    FLUZONE 6 months and older PFS 0.5 ml ([id number]) 09/23/2022    Flucelvax Influenza vaccine, trivalent (ccIIV3), 0.5mL IM 11/03/2023    HEP A 03/24/2011    HEP A,Ped/Adol,(2 Dose) 10/20/2011    HEP B 12/07/2000, 01/09/2001, 05/15/2001    HEP B, Ped/Adol 12/07/2000, 01/09/2001, 05/15/2001    HIB 06/09/1991, 08/21/1991, 01/15/1992    Hib, Unspecified Formulation 06/19/1991, 08/21/1991, 01/15/1992    Hpv Virus Vaccine 9 Yuli Im 08/25/2015, 08/25/2015, 10/20/2015, 10/20/2015, 02/15/2016, 02/15/2016    IPV 12/07/1990, 04/05/1991, 06/07/1991, 04/06/1995    Influenza 02/05/2020    MMR 01/15/1992, 04/26/1995    Meningococcal-Menactra 03/05/2009    OPV 12/07/1990, 12/07/1990, 04/05/1991, 04/05/1991, 06/07/1991, 06/05/1992, 06/05/1992, 04/06/1995, 04/26/1995    Pfizer Covid-19 Vaccine 30mcg/0.3ml 12yrs+ 11/03/2023    TDAP 07/20/2005, 09/20/2016    Td, Preserv Free 07/20/2005    Tetanus/Diptheria 02/05/2020       Osteoporosis Screening: Post menopausal women with a > 9% of fracture in the next 10 years.   Http://www.shef.ac.uk/FRAX/tool.aspx?country=9    Cervical Cancer Screening: The USPSTF recommends screening for cervical cancer in women ages 21 to 65 years with cytology (Pap smear) every 3 years or, for women ages 30 to 65 years who want to lengthen the screening interval, screening with a combination of cytology and human papillomavirus (HPV) testing every 5 years.    Breast Cancer Screening: Yearly starting at the age of 40.  If positive family hx (first degree relative) - Annual mammography starting ten years prior to the age of the youngest family member with breast cancer (but not earlier than age 25 and not later than age 40)    Colon Cancer Screening: Starting at the age of  50.  Yearly FOBT, sigmoidoscopy every 5 years, or colonoscopy every 10 years.       A/P:  1. Routine general medical examination at a health care facility  - reviewed anticipatory guidance based on age    2. Laboratory exam ordered as part of routine general medical examination  - check fasting labs  - CBC [6399] [Q]  - COMP METABOLIC PANEL [22963] [Q]  - TSH W REFLEX TO FREE T4 [49851][Q]    3. PFO (patent foramen ovale) (HCC)  - asymptomatic  - will refer to cards, Dr. Moses Wilson for f/u visit  - Cardio Referral - Internal    Will obtain influenza vaccine at local pharmacy  She understands and agrees with tx plan  RTC in 1yr for next physical, sooner if needed    Meds & Refills for this Visit:  Requested Prescriptions      No prescriptions requested or ordered in this encounter       Imaging & Consults:  CARDIO - INTERNAL      No follow-ups on file.         [1]   Allergies  Allergen Reactions    Sulfa Antibiotics RASH    Sulfamethoxazole W/Trimethoprim RASH

## 2024-11-01 LAB
ABSOLUTE BASOPHILS: 30 CELLS/UL (ref 0–200)
ABSOLUTE EOSINOPHILS: 37 CELLS/UL (ref 15–500)
ABSOLUTE LYMPHOCYTES: 2065 CELLS/UL (ref 850–3900)
ABSOLUTE MONOCYTES: 333 CELLS/UL (ref 200–950)
ABSOLUTE NEUTROPHILS: 4936 CELLS/UL (ref 1500–7800)
ALBUMIN/GLOBULIN RATIO: 1.9 (CALC) (ref 1–2.5)
ALBUMIN: 4.3 G/DL (ref 3.6–5.1)
ALKALINE PHOSPHATASE: 76 U/L (ref 31–125)
ALT: 10 U/L (ref 6–29)
AST: 17 U/L (ref 10–30)
BASOPHILS: 0.4 %
BILIRUBIN, TOTAL: 0.7 MG/DL (ref 0.2–1.2)
BUN: 12 MG/DL (ref 7–25)
CALCIUM: 9 MG/DL (ref 8.6–10.2)
CARBON DIOXIDE: 24 MMOL/L (ref 20–32)
CHLORIDE: 104 MMOL/L (ref 98–110)
CREATININE: 0.76 MG/DL (ref 0.5–0.97)
EGFR: 105 ML/MIN/1.73M2
EOSINOPHILS: 0.5 %
GLOBULIN: 2.3 G/DL (CALC) (ref 1.9–3.7)
GLUCOSE: 65 MG/DL (ref 65–99)
HEMATOCRIT: 42.6 % (ref 35–45)
HEMOGLOBIN: 14.1 G/DL (ref 11.7–15.5)
LYMPHOCYTES: 27.9 %
MCH: 30 PG (ref 27–33)
MCHC: 33.1 G/DL (ref 32–36)
MCV: 90.6 FL (ref 80–100)
MONOCYTES: 4.5 %
MPV: 10.5 FL (ref 7.5–12.5)
NEUTROPHILS: 66.7 %
PLATELET COUNT: 238 THOUSAND/UL (ref 140–400)
POTASSIUM: 4.7 MMOL/L (ref 3.5–5.3)
PROTEIN, TOTAL: 6.6 G/DL (ref 6.1–8.1)
RDW: 11.7 % (ref 11–15)
RED BLOOD CELL COUNT: 4.7 MILLION/UL (ref 3.8–5.1)
SODIUM: 139 MMOL/L (ref 135–146)
TSH W/REFLEX TO FT4: 1.29 MIU/L
WHITE BLOOD CELL COUNT: 7.4 THOUSAND/UL (ref 3.8–10.8)

## (undated) NOTE — LETTER
10/07/21        [facility] [address] [facility] [id number]      Dear Tamra Mallory,    [facility] records indicate that you have outstanding lab work and or testing that was ordered for you and has not yet been completed:  Orders Placed This Encounter

## (undated) NOTE — LETTER
Date & Time: 10/27/2019, 1:06 PM  Patient: Jorge Pate  Encounter Provider(s):    Inna Solorzano MD       To Whom It May Concern:    Devorah Charles was seen and treated in our department on 10/27/2019.  She may return to work on 10/30/19